# Patient Record
Sex: FEMALE | Race: BLACK OR AFRICAN AMERICAN | NOT HISPANIC OR LATINO | Employment: UNEMPLOYED | URBAN - METROPOLITAN AREA
[De-identification: names, ages, dates, MRNs, and addresses within clinical notes are randomized per-mention and may not be internally consistent; named-entity substitution may affect disease eponyms.]

---

## 2019-02-24 ENCOUNTER — APPOINTMENT (OUTPATIENT)
Dept: ULTRASOUND IMAGING | Facility: HOSPITAL | Age: 34
End: 2019-02-24

## 2019-02-24 ENCOUNTER — HOSPITAL ENCOUNTER (EMERGENCY)
Facility: HOSPITAL | Age: 34
Discharge: HOME | End: 2019-02-24
Attending: EMERGENCY MEDICINE | Admitting: EMERGENCY MEDICINE

## 2019-02-24 ENCOUNTER — HOSPITAL ENCOUNTER (OUTPATIENT)
Facility: HOSPITAL | Age: 34
Setting detail: OBSERVATION
Discharge: HOME | End: 2019-02-24
Attending: OBSTETRICS & GYNECOLOGY | Admitting: OBSTETRICS & GYNECOLOGY

## 2019-02-24 VITALS
BODY MASS INDEX: 31.5 KG/M2 | OXYGEN SATURATION: 100 % | TEMPERATURE: 97.8 F | HEART RATE: 76 BPM | RESPIRATION RATE: 18 BRPM | DIASTOLIC BLOOD PRESSURE: 78 MMHG | WEIGHT: 196 LBS | SYSTOLIC BLOOD PRESSURE: 136 MMHG | HEIGHT: 66 IN

## 2019-02-24 VITALS
TEMPERATURE: 98.5 F | HEIGHT: 66 IN | WEIGHT: 221 LBS | HEART RATE: 83 BPM | SYSTOLIC BLOOD PRESSURE: 149 MMHG | DIASTOLIC BLOOD PRESSURE: 87 MMHG | RESPIRATION RATE: 16 BRPM | BODY MASS INDEX: 35.52 KG/M2

## 2019-02-24 DIAGNOSIS — O16.3 HYPERTENSION AFFECTING PREGNANCY IN THIRD TRIMESTER: ICD-10-CM

## 2019-02-24 DIAGNOSIS — D64.9 ANEMIA, UNSPECIFIED TYPE: ICD-10-CM

## 2019-02-24 DIAGNOSIS — Z34.93 THIRD TRIMESTER PREGNANCY: Primary | ICD-10-CM

## 2019-02-24 DIAGNOSIS — O21.9 NAUSEA AND VOMITING IN PREGNANCY: ICD-10-CM

## 2019-02-24 PROBLEM — O36.8190 DECREASED FETAL MOVEMENT: Status: ACTIVE | Noted: 2019-02-24

## 2019-02-24 LAB
ALBUMIN SERPL-MCNC: 3.7 G/DL (ref 3.5–5.2)
ALBUMIN/GLOB SERPL: 1.4 G/DL
ALP SERPL-CCNC: 38 U/L (ref 39–117)
ALT SERPL W P-5'-P-CCNC: 7 U/L (ref 1–33)
ANION GAP SERPL CALCULATED.3IONS-SCNC: 12.8 MMOL/L
AST SERPL-CCNC: 11 U/L (ref 1–32)
BACTERIA UR QL AUTO: ABNORMAL /HPF
BASOPHILS # BLD AUTO: 0.01 10*3/MM3 (ref 0–0.2)
BASOPHILS NFR BLD AUTO: 0.1 % (ref 0–1.5)
BILIRUB SERPL-MCNC: 0.3 MG/DL (ref 0.1–1.2)
BILIRUB UR QL STRIP: NEGATIVE
BUN BLD-MCNC: 3 MG/DL (ref 6–20)
BUN/CREAT SERPL: 7.5 (ref 7–25)
CALCIUM SPEC-SCNC: 8.3 MG/DL (ref 8.6–10.5)
CHLORIDE SERPL-SCNC: 104 MMOL/L (ref 98–107)
CLARITY UR: ABNORMAL
CO2 SERPL-SCNC: 20.2 MMOL/L (ref 22–29)
COLOR UR: YELLOW
CREAT BLD-MCNC: 0.4 MG/DL (ref 0.57–1)
DEPRECATED RDW RBC AUTO: 38.5 FL (ref 37–54)
EOSINOPHIL # BLD AUTO: 0.11 10*3/MM3 (ref 0–0.4)
EOSINOPHIL NFR BLD AUTO: 1.1 % (ref 0.3–6.2)
ERYTHROCYTE [DISTWIDTH] IN BLOOD BY AUTOMATED COUNT: 14.5 % (ref 12.3–15.4)
GFR SERPL CREATININE-BSD FRML MDRD: >150 ML/MIN/1.73
GLOBULIN UR ELPH-MCNC: 2.6 GM/DL
GLUCOSE BLD-MCNC: 73 MG/DL (ref 65–99)
GLUCOSE UR STRIP-MCNC: NEGATIVE MG/DL
HCG INTACT+B SERPL-ACNC: NORMAL MIU/ML
HCT VFR BLD AUTO: 31.8 % (ref 34–46.6)
HGB BLD-MCNC: 9.9 G/DL (ref 12–15.9)
HGB UR QL STRIP.AUTO: NEGATIVE
HYALINE CASTS UR QL AUTO: ABNORMAL /LPF
IMM GRANULOCYTES # BLD AUTO: 0.09 10*3/MM3 (ref 0–0.05)
IMM GRANULOCYTES NFR BLD AUTO: 0.9 % (ref 0–0.5)
KETONES UR QL STRIP: ABNORMAL
LEUKOCYTE ESTERASE UR QL STRIP.AUTO: ABNORMAL
LIPASE SERPL-CCNC: 18 U/L (ref 13–60)
LYMPHOCYTES # BLD AUTO: 1.64 10*3/MM3 (ref 0.7–3.1)
LYMPHOCYTES NFR BLD AUTO: 16.9 % (ref 19.6–45.3)
MCH RBC QN AUTO: 23.3 PG (ref 26.6–33)
MCHC RBC AUTO-ENTMCNC: 31.1 G/DL (ref 31.5–35.7)
MCV RBC AUTO: 75 FL (ref 79–97)
MONOCYTES # BLD AUTO: 0.93 10*3/MM3 (ref 0.1–0.9)
MONOCYTES NFR BLD AUTO: 9.6 % (ref 5–12)
NEUTROPHILS # BLD AUTO: 6.95 10*3/MM3 (ref 1.4–7)
NEUTROPHILS NFR BLD AUTO: 71.4 % (ref 42.7–76)
NITRITE UR QL STRIP: NEGATIVE
NRBC BLD AUTO-RTO: 0 /100 WBC (ref 0–0)
PH UR STRIP.AUTO: 7 [PH] (ref 5–8)
PLATELET # BLD AUTO: 230 10*3/MM3 (ref 140–450)
PMV BLD AUTO: 10.5 FL (ref 6–12)
POTASSIUM BLD-SCNC: 3.4 MMOL/L (ref 3.5–5.2)
PROT SERPL-MCNC: 6.3 G/DL (ref 6–8.5)
PROT UR QL STRIP: NEGATIVE
RBC # BLD AUTO: 4.24 10*6/MM3 (ref 3.77–5.28)
RBC # UR: ABNORMAL /HPF
REF LAB TEST METHOD: ABNORMAL
SODIUM BLD-SCNC: 137 MMOL/L (ref 136–145)
SP GR UR STRIP: 1.02 (ref 1–1.03)
SQUAMOUS #/AREA URNS HPF: ABNORMAL /HPF
UROBILINOGEN UR QL STRIP: ABNORMAL
WBC NRBC COR # BLD: 9.73 10*3/MM3 (ref 3.4–10.8)
WBC UR QL AUTO: ABNORMAL /HPF

## 2019-02-24 PROCEDURE — 96374 THER/PROPH/DIAG INJ IV PUSH: CPT

## 2019-02-24 PROCEDURE — 99283 EMERGENCY DEPT VISIT LOW MDM: CPT

## 2019-02-24 PROCEDURE — G0463 HOSPITAL OUTPT CLINIC VISIT: HCPCS

## 2019-02-24 PROCEDURE — G0378 HOSPITAL OBSERVATION PER HR: HCPCS

## 2019-02-24 PROCEDURE — 96361 HYDRATE IV INFUSION ADD-ON: CPT

## 2019-02-24 PROCEDURE — 80053 COMPREHEN METABOLIC PANEL: CPT | Performed by: PHYSICIAN ASSISTANT

## 2019-02-24 PROCEDURE — 85025 COMPLETE CBC W/AUTO DIFF WBC: CPT | Performed by: PHYSICIAN ASSISTANT

## 2019-02-24 PROCEDURE — 81001 URINALYSIS AUTO W/SCOPE: CPT | Performed by: EMERGENCY MEDICINE

## 2019-02-24 PROCEDURE — 83690 ASSAY OF LIPASE: CPT | Performed by: PHYSICIAN ASSISTANT

## 2019-02-24 PROCEDURE — 84702 CHORIONIC GONADOTROPIN TEST: CPT | Performed by: PHYSICIAN ASSISTANT

## 2019-02-24 PROCEDURE — 25010000002 PROMETHAZINE PER 50 MG: Performed by: EMERGENCY MEDICINE

## 2019-02-24 RX ORDER — SODIUM CHLORIDE 0.9 % (FLUSH) 0.9 %
3 SYRINGE (ML) INJECTION EVERY 12 HOURS SCHEDULED
Status: DISCONTINUED | OUTPATIENT
Start: 2019-02-24 | End: 2019-02-24

## 2019-02-24 RX ORDER — SODIUM CHLORIDE 0.9 % (FLUSH) 0.9 %
3-10 SYRINGE (ML) INJECTION AS NEEDED
Status: DISCONTINUED | OUTPATIENT
Start: 2019-02-24 | End: 2019-02-24

## 2019-02-24 RX ORDER — PROMETHAZINE HYDROCHLORIDE 25 MG/ML
6.25 INJECTION, SOLUTION INTRAMUSCULAR; INTRAVENOUS ONCE
Status: COMPLETED | OUTPATIENT
Start: 2019-02-24 | End: 2019-02-24

## 2019-02-24 RX ORDER — LIDOCAINE HYDROCHLORIDE 10 MG/ML
5 INJECTION, SOLUTION EPIDURAL; INFILTRATION; INTRACAUDAL; PERINEURAL AS NEEDED
Status: DISCONTINUED | OUTPATIENT
Start: 2019-02-24 | End: 2019-02-24

## 2019-02-24 RX ORDER — SODIUM CHLORIDE 0.9 % (FLUSH) 0.9 %
10 SYRINGE (ML) INJECTION AS NEEDED
Status: DISCONTINUED | OUTPATIENT
Start: 2019-02-24 | End: 2019-02-24 | Stop reason: HOSPADM

## 2019-02-24 RX ORDER — DEXTROSE AND SODIUM CHLORIDE 5; .45 G/100ML; G/100ML
1000 INJECTION, SOLUTION INTRAVENOUS CONTINUOUS
Status: DISCONTINUED | OUTPATIENT
Start: 2019-02-24 | End: 2019-02-24 | Stop reason: HOSPADM

## 2019-02-24 RX ADMIN — PROMETHAZINE HYDROCHLORIDE 6.25 MG: 25 INJECTION INTRAMUSCULAR; INTRAVENOUS at 14:24

## 2019-02-24 RX ADMIN — SODIUM CHLORIDE 1000 ML: 9 INJECTION, SOLUTION INTRAVENOUS at 14:26

## 2019-02-24 NOTE — NURSING NOTE
Pt given discharge instructions and notified that Dr Melton will see her in office tomorrow at 0845 at the [facility] office. Pt verbalized understanding but seems very impatient and wants to leave.

## 2019-02-24 NOTE — NON STRESS TEST
Marlene Monteiro, a  at Unknown with an RAJAN of Not found., was seen at [facility] for a nonstress test.    Chief Complaint   Patient presents with   • Contractions      with unknown GA arrives in ER with c/o abdominal cramping, N/V since Friday but got worse this AM. Pt states she knew she was pregnant but never confirmed it with test or seeing a MD. Pt reports +FM and denies LOF, VB. Abdomen palpates soft at this time.    • no prenatal care       Patient Active Problem List   Diagnosis   • Decreased fetal movement       Start Time:1503  Stop Time: 1600

## 2019-02-24 NOTE — NURSING NOTE
PT requesting to leave because she is hungry and sick of being at the hospital. PT mother encouraging pt to stay. Pt states she will stay at this time.

## 2019-03-20 ENCOUNTER — HOSPITAL ENCOUNTER (INPATIENT)
Facility: HOSPITAL | Age: 34
LOS: 2 days | Discharge: HOME | End: 2019-03-22
Attending: OBSTETRICS & GYNECOLOGY | Admitting: OBSTETRICS & GYNECOLOGY

## 2019-03-20 PROBLEM — Z34.90 PREGNANCY: Status: ACTIVE | Noted: 2019-03-20

## 2019-03-20 LAB
AMPHET+METHAMPHET UR QL: NEGATIVE
BARBITURATES UR QL SCN: NEGATIVE
BENZODIAZ UR QL SCN: NEGATIVE
BILIRUB UR QL STRIP: NEGATIVE
CANNABINOIDS SERPL QL: POSITIVE
CLARITY UR: CLEAR
COCAINE UR QL: NEGATIVE
COLOR UR: YELLOW
EXPIRATION DATE: NORMAL
GLUCOSE UR STRIP-MCNC: NEGATIVE MG/DL
HGB UR QL STRIP.AUTO: NEGATIVE
KETONES UR QL STRIP: NEGATIVE
LEUKOCYTE ESTERASE UR QL STRIP.AUTO: NEGATIVE
Lab: NORMAL
METHADONE UR QL SCN: NEGATIVE
NITRITE UR QL STRIP: NEGATIVE
OPIATES UR QL: NEGATIVE
OXYCODONE UR QL SCN: NEGATIVE
PH UR STRIP.AUTO: 7 [PH] (ref 5–8)
PROT UR QL STRIP: NEGATIVE
PROT UR STRIP-MCNC: NEGATIVE MG/DL
SP GR UR STRIP: 1.02 (ref 1–1.03)
UROBILINOGEN UR QL STRIP: NORMAL

## 2019-03-20 PROCEDURE — 85025 COMPLETE CBC W/AUTO DIFF WBC: CPT | Performed by: OBSTETRICS & GYNECOLOGY

## 2019-03-20 PROCEDURE — 80307 DRUG TEST PRSMV CHEM ANLYZR: CPT | Performed by: OBSTETRICS & GYNECOLOGY

## 2019-03-20 PROCEDURE — G0378 HOSPITAL OBSERVATION PER HR: HCPCS

## 2019-03-20 PROCEDURE — 87086 URINE CULTURE/COLONY COUNT: CPT | Performed by: OBSTETRICS & GYNECOLOGY

## 2019-03-20 PROCEDURE — 81003 URINALYSIS AUTO W/O SCOPE: CPT | Performed by: OBSTETRICS & GYNECOLOGY

## 2019-03-20 PROCEDURE — 85007 BL SMEAR W/DIFF WBC COUNT: CPT | Performed by: OBSTETRICS & GYNECOLOGY

## 2019-03-20 PROCEDURE — 80053 COMPREHEN METABOLIC PANEL: CPT | Performed by: OBSTETRICS & GYNECOLOGY

## 2019-03-20 PROCEDURE — G0480 DRUG TEST DEF 1-7 CLASSES: HCPCS | Performed by: OBSTETRICS & GYNECOLOGY

## 2019-03-20 PROCEDURE — 81002 URINALYSIS NONAUTO W/O SCOPE: CPT | Performed by: OBSTETRICS & GYNECOLOGY

## 2019-03-20 RX ORDER — ACETAMINOPHEN 325 MG/1
650 TABLET ORAL AS NEEDED
Status: DISCONTINUED | OUTPATIENT
Start: 2019-03-20 | End: 2019-03-20

## 2019-03-20 RX ORDER — ZOLPIDEM TARTRATE 5 MG/1
5 TABLET ORAL NIGHTLY PRN
Status: DISCONTINUED | OUTPATIENT
Start: 2019-03-20 | End: 2019-03-22 | Stop reason: HOSPADM

## 2019-03-20 RX ORDER — PRENATAL VIT NO.126/IRON/FOLIC 28MG-0.8MG
TABLET ORAL DAILY
COMMUNITY

## 2019-03-20 RX ORDER — ACETAMINOPHEN 325 MG/1
650 TABLET ORAL EVERY 6 HOURS PRN
Status: DISCONTINUED | OUTPATIENT
Start: 2019-03-20 | End: 2019-03-22 | Stop reason: HOSPADM

## 2019-03-20 RX ORDER — PRENATAL VIT NO.126/IRON/FOLIC 28MG-0.8MG
1 TABLET ORAL DAILY
Status: DISCONTINUED | OUTPATIENT
Start: 2019-03-21 | End: 2019-03-22 | Stop reason: HOSPADM

## 2019-03-21 ENCOUNTER — APPOINTMENT (OUTPATIENT)
Dept: ULTRASOUND IMAGING | Facility: HOSPITAL | Age: 34
End: 2019-03-21

## 2019-03-21 LAB
ALBUMIN SERPL-MCNC: 3.1 G/DL (ref 3.5–5.2)
ALBUMIN/GLOB SERPL: 1.3 G/DL
ALP SERPL-CCNC: 62 U/L (ref 39–117)
ALT SERPL W P-5'-P-CCNC: 11 U/L (ref 1–33)
ANION GAP SERPL CALCULATED.3IONS-SCNC: 10.7 MMOL/L
AST SERPL-CCNC: 13 U/L (ref 1–32)
BACTERIA SPEC AEROBE CULT: NORMAL
BILIRUB SERPL-MCNC: 0.2 MG/DL (ref 0.2–1.2)
BUN BLD-MCNC: 5 MG/DL (ref 6–20)
BUN/CREAT SERPL: 12.8 (ref 7–25)
CALCIUM SPEC-SCNC: 8.5 MG/DL (ref 8.6–10.5)
CHLORIDE SERPL-SCNC: 105 MMOL/L (ref 98–107)
CO2 SERPL-SCNC: 23.3 MMOL/L (ref 22–29)
CREAT BLD-MCNC: 0.39 MG/DL (ref 0.57–1)
DEPRECATED RDW RBC AUTO: 36.8 FL (ref 37–54)
EOSINOPHIL # BLD MANUAL: 0.11 10*3/MM3 (ref 0–0.4)
EOSINOPHIL NFR BLD MANUAL: 1 % (ref 0.3–6.2)
ERYTHROCYTE [DISTWIDTH] IN BLOOD BY AUTOMATED COUNT: 13.7 % (ref 12.3–15.4)
GFR SERPL CREATININE-BSD FRML MDRD: >150 ML/MIN/1.73
GLOBULIN UR ELPH-MCNC: 2.4 GM/DL
GLUCOSE BLD-MCNC: 81 MG/DL (ref 65–99)
HCT VFR BLD AUTO: 28.9 % (ref 34–46.6)
HGB BLD-MCNC: 9 G/DL (ref 12–15.9)
LYMPHOCYTES # BLD MANUAL: 1.5 10*3/MM3 (ref 0.7–3.1)
LYMPHOCYTES NFR BLD MANUAL: 1 % (ref 5–12)
LYMPHOCYTES NFR BLD MANUAL: 13.5 % (ref 19.6–45.3)
MCH RBC QN AUTO: 22.8 PG (ref 26.6–33)
MCHC RBC AUTO-ENTMCNC: 31.1 G/DL (ref 31.5–35.7)
MCV RBC AUTO: 73.4 FL (ref 79–97)
MONOCYTES # BLD AUTO: 0.11 10*3/MM3 (ref 0.1–0.9)
NEUTROPHILS # BLD AUTO: 9.4 10*3/MM3 (ref 1.4–7)
NEUTROPHILS NFR BLD MANUAL: 84.4 % (ref 42.7–76)
PLAT MORPH BLD: NORMAL
PLATELET # BLD AUTO: 199 10*3/MM3 (ref 140–450)
PMV BLD AUTO: 10.2 FL (ref 6–12)
POTASSIUM BLD-SCNC: 3.4 MMOL/L (ref 3.5–5.2)
PROT SERPL-MCNC: 5.5 G/DL (ref 6–8.5)
RBC # BLD AUTO: 3.94 10*6/MM3 (ref 3.77–5.28)
RBC MORPH BLD: NORMAL
SODIUM BLD-SCNC: 139 MMOL/L (ref 136–145)
WBC MORPH BLD: NORMAL
WBC NRBC COR # BLD: 11.14 10*3/MM3 (ref 3.4–10.8)

## 2019-03-21 PROCEDURE — 59025 FETAL NON-STRESS TEST: CPT

## 2019-03-21 PROCEDURE — 90791 PSYCH DIAGNOSTIC EVALUATION: CPT | Performed by: SOCIAL WORKER

## 2019-03-21 PROCEDURE — 76805 OB US >/= 14 WKS SNGL FETUS: CPT

## 2019-03-21 PROCEDURE — 76819 FETAL BIOPHYS PROFIL W/O NST: CPT

## 2019-03-21 RX ORDER — LABETALOL 200 MG/1
200 TABLET, FILM COATED ORAL 2 TIMES DAILY
Status: DISCONTINUED | OUTPATIENT
Start: 2019-03-21 | End: 2019-03-22 | Stop reason: HOSPADM

## 2019-03-21 RX ORDER — SODIUM CHLORIDE 0.9 % (FLUSH) 0.9 %
5 SYRINGE (ML) INJECTION EVERY 12 HOURS
Status: DISCONTINUED | OUTPATIENT
Start: 2019-03-21 | End: 2019-03-22 | Stop reason: HOSPADM

## 2019-03-21 RX ORDER — ACETAMINOPHEN 500 MG
1000 TABLET ORAL EVERY 8 HOURS PRN
Status: DISCONTINUED | OUTPATIENT
Start: 2019-03-21 | End: 2019-03-22 | Stop reason: HOSPADM

## 2019-03-21 RX ADMIN — ACETAMINOPHEN 1000 MG: 500 TABLET, FILM COATED ORAL at 21:54

## 2019-03-21 RX ADMIN — Medication 1 TABLET: at 08:22

## 2019-03-21 RX ADMIN — LABETALOL HCL 200 MG: 200 TABLET, FILM COATED ORAL at 21:52

## 2019-03-21 RX ADMIN — SODIUM CHLORIDE, PRESERVATIVE FREE 5 ML: 5 INJECTION INTRAVENOUS at 08:17

## 2019-03-21 RX ADMIN — ZOLPIDEM TARTRATE 5 MG: 5 TABLET ORAL at 01:31

## 2019-03-21 RX ADMIN — ACETAMINOPHEN 1000 MG: 500 TABLET, FILM COATED ORAL at 13:33

## 2019-03-21 RX ADMIN — SODIUM CHLORIDE, PRESERVATIVE FREE 5 ML: 5 INJECTION INTRAVENOUS at 22:27

## 2019-03-21 RX ADMIN — ACETAMINOPHEN 650 MG: 325 TABLET, FILM COATED ORAL at 08:22

## 2019-03-21 RX ADMIN — LABETALOL HCL 200 MG: 200 TABLET, FILM COATED ORAL at 08:22

## 2019-03-21 NOTE — NURSING NOTE
Patient back on unit now and reported she had gone to the [employer].  Patient told housekeepers who were in her room but they had not informed this RN

## 2019-03-21 NOTE — NON STRESS TEST
Marlene Monteiro, a  at 35w3d with an RAJAN of 2019, by Ultrasound, was seen at [facility]TEPART[facility] UNIT for a nonstress test.    Chief Complaint   Patient presents with   • Abdominal Pain     Frequent urination and pressure       Patient Active Problem List   Diagnosis   • Decreased fetal movement   • Pregnancy       Start Time: 1139 Stop Time: 1201  Interpretation A  Nonstress Test Interpretation A: Reactive (19 1201 : Isabela Maldonado RN)

## 2019-03-21 NOTE — NON STRESS TEST
Marlene Avilavin, a  at 35w3d with an RAJAN of 2019, by Ultrasound, was seen at [facility] ANTEPARTUM UNIT for a nonstress test.    Chief Complaint   Patient presents with   • Abdominal Pain     Frequent urination and pressure       Patient Active Problem List   Diagnosis   • Decreased fetal movement   • Pregnancy       Start Time:   Stop Time:   Reactive NST

## 2019-03-21 NOTE — NURSING NOTE
RN to room now to do NST and patient not in room.  Patient cell phone called and no answer.  Patient did not inform RN she was leaving the unit.

## 2019-03-21 NOTE — CONSULTS
"32 y/o pregnant mother of 3 children seen due to an elevated EPDS score. Patient states that she has some night [place] at times since the death of her father and brother in 2016.  She c/o's of broken sleep. She denies any SI or HI.  No hx of having a plan or methods of killing herself.  She denies wishing to be dead. Patient states \"I don't think I'm depressed.\"  She admits to anxiety being a 4 or 5.  She denied any issues w/ her appetite. She reports being tried a lot. Chart indicates she has a hx of seizures.  She reported to the RN that when she has had headaches in the past she has had a seizure. None recently.  She reports she first used marijuana at age 16 and has used almost daily except during pregnancy's.  She does state the last use was in .  She reports using 3 blunts/day.  She reports she has used xanax off of the street due to her difficulty w/ sleep.  She denies use of other substances. UDS was positive for marijuana only.She states she smokes approx 0.5 ppd tobacco. She had smoked from age 16. She has smoked up to a ppd.    Patient reports that her brother  of a Heroin OD. Her father  of a MI.  She denies any hx of having seen a mental health provider or a MOHSEN provider.      Patient lives in an apt w/ her sig other Moshe. The have been together 4 years. She has 3 children. Her son lives w/ his father. Her 2nd child daug age 10 lives w/ a cousin and the 3 y/o lives w/ patient.  She has a 10 grade education.    Patient is alert and O x 4. No SI or HI.  No a/v hallucinations. She rated depression as a 0 and anxiety as a 4-5.  She is open to seeing a counselor. Patient was provided a list of outpatient mental health providers. Explained the programs to patient. No further need for access center to follow patient. Discussed w/ RN.     "

## 2019-03-21 NOTE — PLAN OF CARE
Problem: Patient Care Overview  Goal: Plan of Care Review  Outcome: Ongoing (interventions implemented as appropriate)   03/21/19 1724   Coping/Psychosocial   Plan of Care Reviewed With patient;mother   Plan of Care Review   Progress improving   OTHER   Outcome Summary BP normotensive today. Labetalol started this AM per MD orders. RNST and BPP 8/8 today. 24 hour urine in progress. Patient hoping to be DC home tomorrow.     Goal: Individualization and Mutuality  Outcome: Ongoing (interventions implemented as appropriate)   03/21/19 1724   Individualization   Patient Specific Preferences control BP and be DC home tomorrow   Patient Specific Goals (Include Timeframe) BP stable and pt able to be DC home tomorrow if 24 hr urine wnl   Patient Specific Interventions 24 hr urine, VS every 4 hours, labetalol   Mutuality/Individual Preferences   What Anxieties, Fears, Concerns, or Questions Do You Have About Your Care? having to stay in the hospital   What Information Would Help Us Give You More Personalized Care? hx CHTN   How Would You and/or Your Support Person Like to Participate in Your Care? explanations   Mutuality/Individual Preferences   How to Address Anxieties/Fears keep informed of POC     Goal: Discharge Needs Assessment  Outcome: Ongoing (interventions implemented as appropriate)   03/21/19 1724   Discharge Needs Assessment   Readmission Within the Last 30 Days no previous admission in last 30 days   Concerns to be Addressed no discharge needs identified   Patient/Family Anticipates Transition to home with family   Patient/Family Anticipated Services at Transition none   Transportation Anticipated car, drives self   Anticipated Changes Related to Illness none   Equipment Needed After Discharge none   Offered/Gave Vendor List no   Disability   Equipment Currently Used at Home none     Goal: Interprofessional Rounds/Family Conf  Outcome: Ongoing (interventions implemented as appropriate)   03/21/19 1724    Interdisciplinary Rounds/Family Conf   Participants family;nursing;patient;pharmacy;physician       Problem: Hypertensive Disorders in Pregnancy (Adult,Obstetrics,Pediatric)  Goal: Signs and Symptoms of Listed Potential Problems Will be Absent, Minimized or Managed (Hypertensive Disorders in Pregnancy)  Outcome: Ongoing (interventions implemented as appropriate)   03/21/19 1724   Goal/Outcome Evaluation   Problems Assessed (Hypertensive Disorders in Pregnancy) all   Problems Present (Hypertensive/in PG) none       Problem: Prenatal Patient, Hospitalized (Adult,Obstetrics,Pediatric)  Goal: Signs and Symptoms of Listed Potential Problems Will be Absent, Minimized or Managed (Prenatal Patient, Hospitalized)  Outcome: Ongoing (interventions implemented as appropriate)   03/21/19 1724   Goal/Outcome Evaluation   Problems Assessed (Prenatal Patient) all   Problems Present (Prenatal Patient) none

## 2019-03-21 NOTE — PROGRESS NOTES
"Antepartum Progress Note    S: Patient doing well this morning. Notes good fetal movement. No bleeding or contractions. Denies headaches, vision changes, RUQ pain.    Review of Systems:  Constitutional: denies HA, fever, chills, fatigue, weight loss  Eyes: denies discharge, erythema, vision loss  ENT: denies sore throat, congestion  Pulm: denies cough, SOB, wheezing  CV: denies palpitations, chest pain  GI: Denies symptoms of reflux, abdominal pain, diarrhea, constipation  GYN/: Denies vaginal discharge, abnormal bleeding, dyspareunia; denies dysuria, frequency, hesitancy.  Integumentary: denies rashes, skin changes  Musculoskeletal: denies joint pain, muscle soreness  Psych: Mood overall good. Denies homicidal or suicidal ideation.  All other systems negative    Physical Exam:  /82 (BP Location: Right arm, Patient Position: Lying)   Pulse 78   Temp 98.5 °F (36.9 °C) (Oral)   Resp 18   Ht 167.6 cm (66\")   Wt 109 kg (241 lb) Comment: triage desk during registration  LMP  (LMP Unknown)   SpO2 98%   Breastfeeding? No   BMI 38.90 kg/m²   No LMP recorded (lmp unknown). Patient is pregnant.   Constituitonal: no acute distress, normal appearance  GI: gravid, NT, no rebound/guarding, normal size liver and spleen  : deferred  MSK: normal gait, no c/c/e, normal range of motion of all extremities  Skin: no rashes or ulcers noted, normal skin turgor  Psych: alert and oriented x3; normal mood; good insight  Neuro: no gross cranial nerve deficits; normal sensation     Labs:      2019 12:10 3/20/2019 23:56   Creatinine 0.40 (L) 0.39 (L)   ALT (SGPT) 7 11   AST (SGOT) 11 13   Hemoglobin 9.9 (L) 9.0 (L)   Platelets 230 199       A/P: Marlene Monteiro is a 33 y.o.  at 35w3d with likely exacerbation of her chronic hypertension    Labs WNL. BPs normal to severe range. Will start labetalol 200 mg BID to better control her blood pressures. 24 hour urine protein to be completed around midnight tonight. " Ultrasound has been completed but not yet resulted.    Likely home tomorrow if 24 hour urine protein is normal and her pressures remain under control.    Patient understands and agrees with aforementioned planned.    Austen Melton MD

## 2019-03-21 NOTE — NURSING NOTE
Patient ambulated off unit with her mother just to stretch her legs.  Patient told not to go far due to VS were due soon to be rechecked,

## 2019-03-21 NOTE — H&P
3/20/2019  11:23 PM    Marlene is a 33 y.o.  at 35w2d.    She presents with a chief complaint of some lower abdominal contractions.  However these did resolve during her observation time.  However she was noted to have elevated blood pressures of 160s over 101.  She does have a past history of chronic hypertension however.  She has been on labetalol in the past but nothing recently.  She does report a seizure disorder but was never placed on medication and has been seen by neurologist.  She reports no seizures over the past 2 years.  She was seen for one prenatal visit per Dr. Melton at 32 weeks.  She was observed over the next hour after admission and blood pressures have decreased to 149/86.  As result we will admit obtained 24-hour urine as well as some baseline labs, and observe blood pressures.  Ultrasound will be ordered for in a.m. as well.    Her prenatal care has been significant for:  Patient Active Problem List   Diagnosis   • Decreased fetal movement   • Pregnancy               Penicillins    Exam:  Blood pressure 149/86, pulse 77, temperature 98.9 °F (37.2 °C), temperature source Oral, resp. rate 18.  Edema: 0-1+  Fundus appropriate for dates, presentation pending  Cervix:Not checked by me tonight. //  intact    Monitor: Reactive nonstress test        ASSESS: Intrauterine pregnancy at 35+ weeks with chronic hypertension      PLAN:Admit, evaluate blood pressures.  24-hour urine for creatinine clearance and total protein.  Ultrasound.    Neftali Duarte MD

## 2019-03-22 VITALS
TEMPERATURE: 98.4 F | HEART RATE: 92 BPM | BODY MASS INDEX: 38.73 KG/M2 | DIASTOLIC BLOOD PRESSURE: 83 MMHG | OXYGEN SATURATION: 99 % | HEIGHT: 66 IN | RESPIRATION RATE: 18 BRPM | SYSTOLIC BLOOD PRESSURE: 127 MMHG | WEIGHT: 241 LBS

## 2019-03-22 PROBLEM — O10.913 CHRONIC HYPERTENSION WITH EXACERBATION DURING PREGNANCY IN THIRD TRIMESTER: Status: ACTIVE | Noted: 2019-03-22

## 2019-03-22 LAB
BUPRENORPHINE UR QL: NEGATIVE NG/ML
COLLECT DURATION TIME UR: 24 HRS
PROT 24H UR-MRATE: 221 MG/24HOURS (ref 0–150)
PROT UR-MCNC: 13 MG/DL
SPECIMEN VOL 24H UR: 1700 ML

## 2019-03-22 PROCEDURE — 81050 URINALYSIS VOLUME MEASURE: CPT | Performed by: OBSTETRICS & GYNECOLOGY

## 2019-03-22 PROCEDURE — 59025 FETAL NON-STRESS TEST: CPT

## 2019-03-22 PROCEDURE — 84156 ASSAY OF PROTEIN URINE: CPT | Performed by: OBSTETRICS & GYNECOLOGY

## 2019-03-22 RX ORDER — LABETALOL 200 MG/1
200 TABLET, FILM COATED ORAL 2 TIMES DAILY
Qty: 90 TABLET | Refills: 1 | Status: SHIPPED | OUTPATIENT
Start: 2019-03-22

## 2019-03-22 RX ORDER — ACETAMINOPHEN 500 MG
1000 TABLET ORAL EVERY 8 HOURS PRN
Qty: 30 TABLET | Refills: 1 | Status: SHIPPED | OUTPATIENT
Start: 2019-03-22

## 2019-03-22 RX ADMIN — ACETAMINOPHEN 1000 MG: 500 TABLET, FILM COATED ORAL at 08:34

## 2019-03-22 RX ADMIN — SODIUM CHLORIDE, PRESERVATIVE FREE 5 ML: 5 INJECTION INTRAVENOUS at 09:33

## 2019-03-22 RX ADMIN — LABETALOL HCL 200 MG: 200 TABLET, FILM COATED ORAL at 08:31

## 2019-03-22 RX ADMIN — Medication 1 TABLET: at 08:31

## 2019-03-22 NOTE — PLAN OF CARE
Problem: Patient Care Overview  Goal: Plan of Care Review   03/22/19 1023   Coping/Psychosocial   Plan of Care Reviewed With patient   Plan of Care Review   Progress improving   OTHER   Outcome Summary Pt blood pressures WNL. Denies h/a, vaginal bleeding, ctx. Reports good FM. Medications given as ordered. Plan to discharge home after NST.     Goal: Individualization and Mutuality  Outcome: Ongoing (interventions implemented as appropriate)   03/21/19 1724   Individualization   Patient Specific Preferences control BP and be DC home tomorrow   Patient Specific Goals (Include Timeframe) BP stable and pt able to be DC home tomorrow if 24 hr urine wnl   Patient Specific Interventions 24 hr urine, VS every 4 hours, labetalol   Mutuality/Individual Preferences   What Anxieties, Fears, Concerns, or Questions Do You Have About Your Care? having to stay in the hospital   What Information Would Help Us Give You More Personalized Care? hx CHTN   How Would You and/or Your Support Person Like to Participate in Your Care? explanations   Mutuality/Individual Preferences   How to Address Anxieties/Fears keep informed of POC     Goal: Discharge Needs Assessment  Outcome: Ongoing (interventions implemented as appropriate)   03/21/19 1724   Discharge Needs Assessment   Readmission Within the Last 30 Days no previous admission in last 30 days   Concerns to be Addressed no discharge needs identified   Patient/Family Anticipates Transition to home with family   Patient/Family Anticipated Services at Transition none   Transportation Anticipated car, drives self   Anticipated Changes Related to Illness none   Equipment Needed After Discharge none   Offered/Gave Vendor List no   Disability   Equipment Currently Used at Home none     Goal: Interprofessional Rounds/Family Conf  Outcome: Ongoing (interventions implemented as appropriate)   03/21/19 1724   Interdisciplinary Rounds/Family Conf   Participants  family;nursing;patient;pharmacy;physician       Problem: Hypertensive Disorders in Pregnancy (Adult,Obstetrics,Pediatric)  Goal: Signs and Symptoms of Listed Potential Problems Will be Absent, Minimized or Managed (Hypertensive Disorders in Pregnancy)  Outcome: Ongoing (interventions implemented as appropriate)   03/21/19 1724   Goal/Outcome Evaluation   Problems Assessed (Hypertensive Disorders in Pregnancy) all   Problems Present (Hypertensive/in PG) none       Problem: Prenatal Patient, Hospitalized (Adult,Obstetrics,Pediatric)  Goal: Signs and Symptoms of Listed Potential Problems Will be Absent, Minimized or Managed (Prenatal Patient, Hospitalized)  Outcome: Ongoing (interventions implemented as appropriate)   03/21/19 1724   Goal/Outcome Evaluation   Problems Assessed (Prenatal Patient) all   Problems Present (Prenatal Patient) none

## 2019-03-22 NOTE — NON STRESS TEST
Barberxu Monteiro, a  at 35w4d with an RAJAN of 2019, by Ultrasound, was seen at [facility]TEPARTUM UNIT for a nonstress test.    Chief Complaint   Patient presents with   • Abdominal Pain     Frequent urination and pressure       Patient Active Problem List   Diagnosis   • Decreased fetal movement   • Pregnancy   • Chronic hypertension with exacerbation during pregnancy in third trimester       Start Time: 1059  Stop Time: 1130    Interpretation A  Nonstress Test Interpretation A: Reactive (19 1130 : Rebecca Robbins RN)

## 2019-03-22 NOTE — DISCHARGE SUMMARY
Department of OBGYN Discharge Summary    Name: Marlene Monteiro  : 1985  MRN: [id number]  Admission Date: 3/20/2019  Discharge Date: 3/22/2019   Admit Attending: Neftali Duarte MD   Discharge Attending: Austen Melton MD   Service: OBGYN  Admission Diagnosis/Diagnoses: Pregnancy [Z34.90]  Pregnancy [Z34.90]   Final Diagnosis/Diagnoses:   Patient Active Problem List   Diagnosis   • Decreased fetal movement   • Pregnancy   • Chronic hypertension with exacerbation during pregnancy in third trimester      Consults: IP CONSULT TO ACCESS CENTER  Procedures: none    OB History    Para Term  AB Living   5 3 3   1 3   SAB TAB Ectopic Molar Multiple Live Births   1         3      # Outcome Date GA Lbr Doc/2nd Weight Sex Delivery Anes PTL Lv   5 Current            4 SAB 10/2017     SAB   FD   3 Term 04/24/15    F Vag-Spont   ASHLEE   2 Term 08/16/10    F Vag-Spont   ASHLEE   1 Term 06 40w0d   M    ASHLEE        Past Medical History:   Diagnosis Date   • Seizure (CMS/HCC)    • Urinary tract infection    • Varicella       Past Surgical History:   Procedure Laterality Date   • WISDOM TOOTH EXTRACTION        Allergies   Allergen Reactions   • Penicillins Unknown (See Comments)     Pt does not know reaction        Physical Exam:  Vitals:    19 2300 19 0300 19 0834   BP: 152/94 133/88 119/72 127/83   BP Location: Right arm Left arm Left arm Left arm   Patient Position: Sitting Sitting Lying Lying   Pulse: 93 86 83 92   Resp: 18 18 16 18   Temp: 98.2 °F (36.8 °C) 98.7 °F (37.1 °C)  98.4 °F (36.9 °C)   TempSrc: Oral Oral  Oral   SpO2: 100%   99%   Weight:       Height:          Gen: A&Ox3, NAD  CV: RRR, no m/r/g  Lungs: CTAb  Abd: soft, NT, ND, +bs  Ext: no c/c/e    Brief Hospital Course: Marlene Monteiro is a 33 y.o.  who presented with elevated blood pressures at 35w2d. She was admitted for further evaluation. Labs normal. 24 hour urine protein normal. BPs well controlled  on labetalol 200 mg BID. She did not meet criteria for pre-eclampsia. She most likely had an exacerbation of her previously diagnosed chronic hypertension. Decision made to discharge home on labetalol.     Discharge Condition: good  Disposition: Discharged to home    Discharge Medication List as of 3/22/2019 10:23 AM      START taking these medications    Details   acetaminophen (TYLENOL) 500 MG tablet Take 2 tablets by mouth Every 8 (Eight) Hours As Needed for Mild Pain , Moderate Pain  or Headache., Starting Fri 3/22/2019, Print      labetalol (NORMODYNE) 200 MG tablet Take 1 tablet by mouth 2 (Two) Times a Day., Starting Fri 3/22/2019, Print             Discharge Instructions:    1. Activity:  At home you may eat a regular diet as tolerated. You may shower.      2. General precautions--  A. if you get a fever greater than 100.4 F, have severe abdominal pain, notice foul smelling vaginal discharge, these could indicate infection and you should call your physician or come to the ER right away.  B. If you notice increased vaginal bleeding that is getting worse since you left the hospital, or if you pass large clots, you should return to the ER or call your doctor right away  C. If you are short of breath, dizzy when you stand up, have palpitations, or feel very weak when you get home, you should call your doctor or come to the ER as these could be symptoms of severe anemia.  D. If you have sudden chest pain, palpitations, difficulty breathing, you should come to the ER right away.    3. Be sure to take all medications as prescribed and to keep all appointments you have with your physician.    Follow up with:  1. Dr. Melton in 1 week    Signed,  Austen Melton MD

## 2019-03-22 NOTE — PROGRESS NOTES
"Antepartum Progress Note    S: Patient doing well this morning. Notes good fetal movement. No bleeding or contractions. Denies vision changes, RUQ pain. Occasional headaches that are relieved with tylenol.    Review of Systems:  Constitutional: denies HA, fever, chills, fatigue, weight loss  Eyes: denies discharge, erythema, vision loss  ENT: denies sore throat, congestion  Pulm: denies cough, SOB, wheezing  CV: denies palpitations, chest pain  GI: Denies symptoms of reflux, abdominal pain, diarrhea, constipation  GYN/: Denies vaginal discharge, abnormal bleeding, dyspareunia; denies dysuria, frequency, hesitancy.  Integumentary: denies rashes, skin changes  Musculoskeletal: denies joint pain, muscle soreness  Psych: Mood overall good. Denies homicidal or suicidal ideation.  All other systems negative    Physical Exam:  /83 (BP Location: Left arm, Patient Position: Lying)   Pulse 92   Temp 98.4 °F (36.9 °C) (Oral)   Resp 18   Ht 167.6 cm (66\")   Wt 109 kg (241 lb) Comment: triage desk during registration  LMP  (LMP Unknown)   SpO2 99%   Breastfeeding? No   BMI 38.90 kg/m²   No LMP recorded (lmp unknown). Patient is pregnant.   Constitutional: no acute distress, normal appearance  GI: gravid, NT, no rebound/guarding, normal size liver and spleen  : deferred  MSK: normal gait, no c/c/e, normal range of motion of all extremities  Skin: no rashes or ulcers noted, normal skin turgor  Psych: alert and oriented x3; normal mood; good insight  Neuro: no gross cranial nerve deficits; normal sensation     Labs:      2019 12:10 3/20/2019 23:56   Creatinine 0.40 (L) 0.39 (L)   ALT (SGPT) 7 11   AST (SGOT) 11 13   Hemoglobin 9.9 (L) 9.0 (L)   Platelets 230 199     24 hour urine protein = 221 mg    A/P: Marlene Monteiro is a 33 y.o.  at 35w4d with exacerbation of her chronic hypertension    Labs and 24 hour urine protein WNL. BPs normal to mild range on labetalol 200 mg BID.     Will discharge home on " labetalol and have her follow up in clinic in one week    Patient understands and agrees with aforementioned planned.    Austen Melton MD

## 2019-03-22 NOTE — NON STRESS TEST
Marlene Monteiro, a  at 35w4d with an RAJAN of 2019, by Ultrasound, was seen at [facility] ANTEPARTUM UNIT for a nonstress test.    Chief Complaint   Patient presents with   • Abdominal Pain     Frequent urination and pressure       Patient Active Problem List   Diagnosis   • Decreased fetal movement   • Pregnancy       Start Time:   Stop Time:      reactive

## 2019-03-22 NOTE — PLAN OF CARE
Problem: Patient Care Overview  Goal: Plan of Care Review  Outcome: Ongoing (interventions implemented as appropriate)   03/21/19 1724 03/21/19 2005 03/22/19 0565   Coping/Psychosocial   Plan of Care Reviewed With --  patient;mother --    Plan of Care Review   Progress improving --  --    OTHER   Outcome Summary --  --  RNST, 24 hour protein resulted as 221.0. initial b/p elevated at 152/94 but pt. had t.v. on loud and watching sports, lights up and visiting with family. next 2 b/p readings were 133/88 and 119/72. pt. had a very mild headache last night but denied any visual changes or epigastric pain. pt. has 1+ lower extremity edema, normoreflexic and no clonus. pt. hopeful to go home     Goal: Individualization and Mutuality  Outcome: Ongoing (interventions implemented as appropriate)   03/21/19 1724   Individualization   Patient Specific Preferences control BP and be DC home tomorrow   Patient Specific Goals (Include Timeframe) BP stable and pt able to be DC home tomorrow if 24 hr urine wnl   Patient Specific Interventions 24 hr urine, VS every 4 hours, labetalol   Mutuality/Individual Preferences   What Anxieties, Fears, Concerns, or Questions Do You Have About Your Care? having to stay in the hospital   What Information Would Help Us Give You More Personalized Care? hx CHTN   How Would You and/or Your Support Person Like to Participate in Your Care? explanations   Mutuality/Individual Preferences   How to Address Anxieties/Fears keep informed of POC     Goal: Discharge Needs Assessment  Outcome: Ongoing (interventions implemented as appropriate)   03/21/19 1724   Discharge Needs Assessment   Readmission Within the Last 30 Days no previous admission in last 30 days   Concerns to be Addressed no discharge needs identified   Patient/Family Anticipates Transition to home with family   Patient/Family Anticipated Services at Transition none   Transportation Anticipated car, drives self   Anticipated Changes Related  to Illness none   Equipment Needed After Discharge none   Offered/Gave Vendor List no   Disability   Equipment Currently Used at Home none     Goal: Interprofessional Rounds/Family Conf  Outcome: Ongoing (interventions implemented as appropriate)   03/21/19 1724   Interdisciplinary Rounds/Family Conf   Participants family;nursing;patient;pharmacy;physician       Problem: Hypertensive Disorders in Pregnancy (Adult,Obstetrics,Pediatric)  Goal: Signs and Symptoms of Listed Potential Problems Will be Absent, Minimized or Managed (Hypertensive Disorders in Pregnancy)  Outcome: Ongoing (interventions implemented as appropriate)   03/21/19 1724   Goal/Outcome Evaluation   Problems Assessed (Hypertensive Disorders in Pregnancy) all   Problems Present (Hypertensive/in PG) none       Problem: Prenatal Patient, Hospitalized (Adult,Obstetrics,Pediatric)  Goal: Signs and Symptoms of Listed Potential Problems Will be Absent, Minimized or Managed (Prenatal Patient, Hospitalized)  Outcome: Ongoing (interventions implemented as appropriate)   03/21/19 1724   Goal/Outcome Evaluation   Problems Assessed (Prenatal Patient) all   Problems Present (Prenatal Patient) none

## 2019-03-26 LAB
CANNABINOIDS UR QL CFM: POSITIVE
Lab: ABNORMAL
THC UR CFM-MCNC: 226 NG/ML

## 2019-04-09 ENCOUNTER — HOSPITAL ENCOUNTER (OUTPATIENT)
Facility: HOSPITAL | Age: 34
Setting detail: OBSERVATION
Discharge: HOME | End: 2019-04-10
Attending: OBSTETRICS & GYNECOLOGY | Admitting: OBSTETRICS & GYNECOLOGY

## 2019-04-09 PROCEDURE — G0378 HOSPITAL OBSERVATION PER HR: HCPCS

## 2019-04-09 PROCEDURE — 59025 FETAL NON-STRESS TEST: CPT

## 2019-04-09 PROCEDURE — 80053 COMPREHEN METABOLIC PANEL: CPT | Performed by: OBSTETRICS & GYNECOLOGY

## 2019-04-09 PROCEDURE — 85027 COMPLETE CBC AUTOMATED: CPT | Performed by: OBSTETRICS & GYNECOLOGY

## 2019-04-09 RX ORDER — LABETALOL 200 MG/1
200 TABLET, FILM COATED ORAL ONCE
Status: COMPLETED | OUTPATIENT
Start: 2019-04-10 | End: 2019-04-09

## 2019-04-09 RX ADMIN — LABETALOL HCL 200 MG: 200 TABLET, FILM COATED ORAL at 23:50

## 2019-04-10 VITALS
BODY MASS INDEX: 37.61 KG/M2 | TEMPERATURE: 98.5 F | SYSTOLIC BLOOD PRESSURE: 167 MMHG | HEIGHT: 66 IN | DIASTOLIC BLOOD PRESSURE: 86 MMHG | WEIGHT: 234 LBS | HEART RATE: 85 BPM | RESPIRATION RATE: 18 BRPM

## 2019-04-10 LAB
ALBUMIN SERPL-MCNC: 3.3 G/DL (ref 3.5–5.2)
ALBUMIN/GLOB SERPL: 1.2 G/DL
ALP SERPL-CCNC: 110 U/L (ref 39–117)
ALT SERPL W P-5'-P-CCNC: 7 U/L (ref 1–33)
ANION GAP SERPL CALCULATED.3IONS-SCNC: 11.4 MMOL/L
AST SERPL-CCNC: 12 U/L (ref 1–32)
BASOPHILS # BLD AUTO: 0.02 10*3/MM3 (ref 0–0.2)
BASOPHILS NFR BLD AUTO: 0.2 % (ref 0–1.5)
BILIRUB SERPL-MCNC: <0.2 MG/DL (ref 0.2–1.2)
BUN BLD-MCNC: 5 MG/DL (ref 6–20)
BUN/CREAT SERPL: 11.9 (ref 7–25)
CALCIUM SPEC-SCNC: 8.8 MG/DL (ref 8.6–10.5)
CHLORIDE SERPL-SCNC: 105 MMOL/L (ref 98–107)
CO2 SERPL-SCNC: 23.6 MMOL/L (ref 22–29)
CREAT BLD-MCNC: 0.42 MG/DL (ref 0.57–1)
DEPRECATED RDW RBC AUTO: 36.8 FL (ref 37–54)
EOSINOPHIL # BLD AUTO: 0.25 10*3/MM3 (ref 0–0.4)
EOSINOPHIL NFR BLD AUTO: 2.2 % (ref 0.3–6.2)
ERYTHROCYTE [DISTWIDTH] IN BLOOD BY AUTOMATED COUNT: 14 % (ref 12.3–15.4)
GFR SERPL CREATININE-BSD FRML MDRD: >150 ML/MIN/1.73
GLOBULIN UR ELPH-MCNC: 2.7 GM/DL
GLUCOSE BLD-MCNC: 91 MG/DL (ref 65–99)
HCT VFR BLD AUTO: 31.2 % (ref 34–46.6)
HGB BLD-MCNC: 10 G/DL (ref 12–15.9)
IMM GRANULOCYTES # BLD AUTO: 0.07 10*3/MM3 (ref 0–0.05)
IMM GRANULOCYTES NFR BLD AUTO: 0.6 % (ref 0–0.5)
LYMPHOCYTES # BLD AUTO: 2.62 10*3/MM3 (ref 0.7–3.1)
LYMPHOCYTES NFR BLD AUTO: 22.8 % (ref 19.6–45.3)
MCH RBC QN AUTO: 23.2 PG (ref 26.6–33)
MCHC RBC AUTO-ENTMCNC: 32.1 G/DL (ref 31.5–35.7)
MCV RBC AUTO: 72.4 FL (ref 79–97)
MONOCYTES # BLD AUTO: 1.3 10*3/MM3 (ref 0.1–0.9)
MONOCYTES NFR BLD AUTO: 11.3 % (ref 5–12)
NEUTROPHILS # BLD AUTO: 7.24 10*3/MM3 (ref 1.4–7)
NEUTROPHILS NFR BLD AUTO: 62.9 % (ref 42.7–76)
NRBC BLD AUTO-RTO: 0 /100 WBC (ref 0–0)
PLATELET # BLD AUTO: 248 10*3/MM3 (ref 140–450)
PMV BLD AUTO: 11.7 FL (ref 6–12)
POTASSIUM BLD-SCNC: 3.7 MMOL/L (ref 3.5–5.2)
PROT SERPL-MCNC: 6 G/DL (ref 6–8.5)
RBC # BLD AUTO: 4.31 10*6/MM3 (ref 3.77–5.28)
SODIUM BLD-SCNC: 140 MMOL/L (ref 136–145)
WBC # BLD AUTO: 11.5 10*3/MM3 (ref 3.4–10.8)
WBC NRBC COR # BLD: 11.5 10*3/MM3 (ref 3.4–10.8)

## 2019-04-10 PROCEDURE — G0378 HOSPITAL OBSERVATION PER HR: HCPCS

## 2019-04-10 NOTE — NON STRESS TEST
Annanitin Cayetanonitin  at 38w2d with an RAJAN of 2019, by Ultrasound, was seen at [facility] LABOR DELIVERY for a nonstress test.    Chief Complaint   Patient presents with   • Contractions     patient presents to labor and dxelivery at 38 weeks gestation with complaints of contractions and pressure       Patient Active Problem List   Diagnosis   • Decreased fetal movement   • Pregnancy   • Chronic hypertension with exacerbation during pregnancy in third trimester         Interpretation A  Nonstress Test Interpretation A: Reactive (04/10/19 0030 : Lucero Bernard, RN)    Patient to follow up with Dr. Melton 4-  Jerri Reno MD

## 2022-04-06 NOTE — NURSING NOTE
Called infusion center and verified they received the order.  Was told that it would have to be approved by their pharmacist and that if approved they might not be able to get to her until next week.   Patient to SOL per WC by transporter.